# Patient Record
Sex: MALE | Race: WHITE
[De-identification: names, ages, dates, MRNs, and addresses within clinical notes are randomized per-mention and may not be internally consistent; named-entity substitution may affect disease eponyms.]

---

## 2023-12-20 ENCOUNTER — APPOINTMENT (OUTPATIENT)
Dept: INTERNAL MEDICINE | Facility: CLINIC | Age: 38
End: 2023-12-20

## 2023-12-27 ENCOUNTER — APPOINTMENT (OUTPATIENT)
Dept: FAMILY MEDICINE | Facility: CLINIC | Age: 38
End: 2023-12-27
Payer: COMMERCIAL

## 2023-12-27 VITALS
HEIGHT: 66 IN | BODY MASS INDEX: 21.38 KG/M2 | TEMPERATURE: 97.7 F | WEIGHT: 133 LBS | DIASTOLIC BLOOD PRESSURE: 86 MMHG | OXYGEN SATURATION: 97 % | HEART RATE: 84 BPM | SYSTOLIC BLOOD PRESSURE: 133 MMHG

## 2023-12-27 DIAGNOSIS — Z78.9 OTHER SPECIFIED HEALTH STATUS: ICD-10-CM

## 2023-12-27 DIAGNOSIS — Z00.00 ENCOUNTER FOR GENERAL ADULT MEDICAL EXAMINATION W/OUT ABNORMAL FINDINGS: ICD-10-CM

## 2023-12-27 DIAGNOSIS — K50.90 CROHN'S DISEASE, UNSPECIFIED, W/OUT COMPLICATIONS: ICD-10-CM

## 2023-12-27 DIAGNOSIS — Z83.79 FAMILY HISTORY OF OTHER DISEASES OF THE DIGESTIVE SYSTEM: ICD-10-CM

## 2023-12-27 DIAGNOSIS — M54.50 LOW BACK PAIN, UNSPECIFIED: ICD-10-CM

## 2023-12-27 DIAGNOSIS — Z80.0 FAMILY HISTORY OF MALIGNANT NEOPLASM OF DIGESTIVE ORGANS: ICD-10-CM

## 2023-12-27 DIAGNOSIS — Z23 ENCOUNTER FOR IMMUNIZATION: ICD-10-CM

## 2023-12-27 DIAGNOSIS — R05.9 COUGH, UNSPECIFIED: ICD-10-CM

## 2023-12-27 DIAGNOSIS — Z80.1 FAMILY HISTORY OF MALIGNANT NEOPLASM OF TRACHEA, BRONCHUS AND LUNG: ICD-10-CM

## 2023-12-27 PROCEDURE — 99385 PREV VISIT NEW AGE 18-39: CPT

## 2023-12-27 RX ORDER — MINOXIDIL 2.5 MG/1
2.5 TABLET ORAL DAILY
Refills: 0 | Status: ACTIVE | COMMUNITY
Start: 2023-12-27

## 2023-12-27 NOTE — HISTORY OF PRESENT ILLNESS
[FreeTextEntry1] : Establish care, CPE [de-identified] : 39 yo male presents to establish care, CPE. Last CPE was about 1 year ago.  Patient reports hx Crohn's Disease diagnosed at age 11, follows with GI at [facility] Dr. Hossein Celis. Not currently on medication, symptoms well controlled. Patient c/o cough for past 1 year, states it only happens indoors. States it is intermittently wet/dry. Has not tried anything to help with cough. No new pets. Denies reflux symptoms. Denies SOB.  Patient also c/o back pain, stands a lot at work, requesting referral to PT. C/o hearing changes, states he used to attend many concerts without ear protection. Interested in hearing eval. Patient otherwise feeling well today.

## 2023-12-27 NOTE — PHYSICAL EXAM
[Normal TMs] : both tympanic membranes were normal [No Lymphadenopathy] : no lymphadenopathy [Supple] : supple [Thyroid Normal, No Nodules] : the thyroid was normal and there were no nodules present [Coordination Grossly Intact] : coordination grossly intact [No Focal Deficits] : no focal deficits [Normal Gait] : normal gait [Normal] : affect was normal and insight and judgment were intact

## 2023-12-27 NOTE — REVIEW OF SYSTEMS
[Cough] : cough [Negative] : Psychiatric [Shortness Of Breath] : no shortness of breath [Wheezing] : no wheezing [Back Pain] : back pain

## 2024-01-10 ENCOUNTER — NON-APPOINTMENT (OUTPATIENT)
Age: 39
End: 2024-01-10

## 2024-01-17 ENCOUNTER — APPOINTMENT (OUTPATIENT)
Dept: OTOLARYNGOLOGY | Facility: CLINIC | Age: 39
End: 2024-01-17
Payer: COMMERCIAL

## 2024-01-17 VITALS
WEIGHT: 133 LBS | DIASTOLIC BLOOD PRESSURE: 86 MMHG | HEART RATE: 89 BPM | SYSTOLIC BLOOD PRESSURE: 142 MMHG | TEMPERATURE: 98.3 F | OXYGEN SATURATION: 97 % | HEIGHT: 66 IN | BODY MASS INDEX: 21.38 KG/M2

## 2024-01-17 DIAGNOSIS — Z78.9 OTHER SPECIFIED HEALTH STATUS: ICD-10-CM

## 2024-01-17 DIAGNOSIS — H91.90 UNSPECIFIED HEARING LOSS, UNSPECIFIED EAR: ICD-10-CM

## 2024-01-17 PROCEDURE — 99203 OFFICE O/P NEW LOW 30 MIN: CPT

## 2024-01-17 PROCEDURE — 92550 TYMPANOMETRY & REFLEX THRESH: CPT | Mod: 52

## 2024-01-17 PROCEDURE — 92557 COMPREHENSIVE HEARING TEST: CPT

## 2024-01-17 NOTE — PHYSICAL EXAM
[de-identified] : intact, well-aerated bilaterally [de-identified] : slight right septal deviation [de-identified] : mucosa dry & hyperemic, moderate-severe turbinate hypertrophy [de-identified] : 3+ [Normal] : no rashes

## 2024-01-17 NOTE — HISTORY OF PRESENT ILLNESS
[de-identified] : Mr. RANCHO ANDREWS is a pleasant 38 year male presenting today as referral from Dr Rocha for hearing loss.   Pt reports that he has been experienced hearing loss for 1-2 years. He reports that it is a steady decline. 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 He also has some trouble hearing his girlfriend. 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 He has been wearing hearing protection recently. He denies tinnitus, aural fullness, vertigo, or dizziness. He denies family h/o hearing loss. Denies personal h/o recurrent ear infections, otologic surgery, or otologic trauma. Pt has not had audiogram prior to today and not used hearing aids before.   Past medical/surgical history includes Crohn's disease. Nonsmoker. Not on anticoagulation. Works as hairdresser. Here by himself.

## 2024-01-17 NOTE — ASSESSMENT
[FreeTextEntry1] : .  Plan: - Audiogram reviewed with patient. Hearing protection discussed. Will plan for annual audiogram to follow. - Discussed preventative measures for nasal moisturization including Vaseline, nasal saline sprays, ambient humidification. - RTC 1 year with audiogram  -- Ashley Ferreira MD Facial Plastic & Reconstructive Surgery

## 2025-02-05 ENCOUNTER — APPOINTMENT (OUTPATIENT)
Dept: OTOLARYNGOLOGY | Facility: CLINIC | Age: 40
End: 2025-02-05
Payer: COMMERCIAL

## 2025-02-05 DIAGNOSIS — H91.90 UNSPECIFIED HEARING LOSS, UNSPECIFIED EAR: ICD-10-CM

## 2025-02-05 PROCEDURE — 92557 COMPREHENSIVE HEARING TEST: CPT

## 2025-02-05 PROCEDURE — 99213 OFFICE O/P EST LOW 20 MIN: CPT

## 2025-02-05 PROCEDURE — 92550 TYMPANOMETRY & REFLEX THRESH: CPT | Mod: 52
